# Patient Record
Sex: FEMALE | Race: WHITE | Employment: OTHER
[De-identification: names, ages, dates, MRNs, and addresses within clinical notes are randomized per-mention and may not be internally consistent; named-entity substitution may affect disease eponyms.]

---

## 2020-01-01 ENCOUNTER — HOME CARE VISIT (OUTPATIENT)
Dept: SCHEDULING | Facility: HOME HEALTH | Age: 85
End: 2020-01-01
Payer: MEDICARE

## 2020-01-01 ENCOUNTER — HOME CARE VISIT (OUTPATIENT)
Dept: HOSPICE | Facility: HOSPICE | Age: 85
End: 2020-01-01
Payer: MEDICARE

## 2020-01-01 ENCOUNTER — HOSPICE ADMISSION (OUTPATIENT)
Dept: HOSPICE | Facility: HOSPICE | Age: 85
End: 2020-01-01
Payer: MEDICARE

## 2020-01-01 VITALS
TEMPERATURE: 99.5 F | HEART RATE: 96 BPM | DIASTOLIC BLOOD PRESSURE: 46 MMHG | SYSTOLIC BLOOD PRESSURE: 104 MMHG | RESPIRATION RATE: 24 BRPM

## 2020-01-01 VITALS
HEART RATE: 100 BPM | SYSTOLIC BLOOD PRESSURE: 66 MMHG | TEMPERATURE: 99.7 F | RESPIRATION RATE: 28 BRPM | DIASTOLIC BLOOD PRESSURE: 42 MMHG

## 2020-01-01 VITALS
TEMPERATURE: 97.9 F | SYSTOLIC BLOOD PRESSURE: 72 MMHG | RESPIRATION RATE: 20 BRPM | DIASTOLIC BLOOD PRESSURE: 40 MMHG | HEART RATE: 100 BPM

## 2020-01-01 VITALS
BODY MASS INDEX: 17.67 KG/M2 | SYSTOLIC BLOOD PRESSURE: 104 MMHG | RESPIRATION RATE: 18 BRPM | DIASTOLIC BLOOD PRESSURE: 64 MMHG | OXYGEN SATURATION: 90 % | HEART RATE: 93 BPM | HEIGHT: 60 IN | TEMPERATURE: 97.4 F | WEIGHT: 90 LBS

## 2020-01-01 VITALS — HEART RATE: 96 BPM | SYSTOLIC BLOOD PRESSURE: 106 MMHG | DIASTOLIC BLOOD PRESSURE: 64 MMHG

## 2020-01-01 VITALS
RESPIRATION RATE: 20 BRPM | HEART RATE: 84 BPM | SYSTOLIC BLOOD PRESSURE: 98 MMHG | SYSTOLIC BLOOD PRESSURE: 100 MMHG | DIASTOLIC BLOOD PRESSURE: 56 MMHG | DIASTOLIC BLOOD PRESSURE: 68 MMHG | HEART RATE: 88 BPM | RESPIRATION RATE: 20 BRPM

## 2020-01-01 PROCEDURE — G0156 HHCP-SVS OF AIDE,EA 15 MIN: HCPCS

## 2020-01-01 PROCEDURE — 0651 HSPC ROUTINE HOME CARE

## 2020-01-01 PROCEDURE — G0299 HHS/HOSPICE OF RN EA 15 MIN: HCPCS

## 2020-01-01 PROCEDURE — A6250 SKIN SEAL PROTECT MOISTURIZR: HCPCS

## 2020-01-01 PROCEDURE — 3336500001 HSPC ELECTION

## 2020-01-01 PROCEDURE — HOSPICE MEDICATION HC HH HOSPICE MEDICATION

## 2020-01-01 PROCEDURE — G0155 HHCP-SVS OF CSW,EA 15 MIN: HCPCS

## 2020-01-01 PROCEDURE — T4541 LARGE DISPOSABLE UNDERPAD: HCPCS

## 2020-01-01 PROCEDURE — 3331090004 HSPC SERVICE INTENSITY ADD-ON

## 2020-01-01 PROCEDURE — T4526 ADULT SIZE PULL-ON MED: HCPCS

## 2020-08-17 PROBLEM — G30.9 ALZHEIMER'S DEMENTIA (HCC): Status: ACTIVE | Noted: 2020-01-01

## 2020-08-17 PROBLEM — Z51.5 HOSPICE CARE: Status: ACTIVE | Noted: 2020-01-01

## 2020-08-17 PROBLEM — F02.80 ALZHEIMER'S DEMENTIA (HCC): Status: ACTIVE | Noted: 2020-01-01

## 2020-08-25 PROBLEM — R44.1 EPISODES OF FORMED VISUAL HALLUCINATIONS: Status: ACTIVE | Noted: 2020-01-01

## 2020-08-25 PROBLEM — I69.391 DYSPHAGIA AS LATE EFFECT OF CEREBROVASCULAR ACCIDENT (CVA): Status: ACTIVE | Noted: 2020-01-01

## 2020-08-25 PROBLEM — I69.993 CVA, OLD, ATAXIA: Status: ACTIVE | Noted: 2020-01-01

## 2020-08-25 PROBLEM — I95.0 IDIOPATHIC HYPOTENSION: Status: ACTIVE | Noted: 2020-01-01

## 2020-08-25 PROBLEM — F01.50 VASCULAR DEMENTIA WITHOUT BEHAVIORAL DISTURBANCE (HCC): Status: ACTIVE | Noted: 2020-01-01

## 2020-08-31 NOTE — PROGRESS NOTES
Pt/family has continued to decline  services since admit.  received call later part of day from pt' Rn Cm 8/28/2020 informing the  that she and the pt's daughter, Desmond Pastrana, had talked and that Desmond Pastrana felt that her brother Dariana Anand, may need some assistance in coping. Rn Jeb stated that a Monday call would be ok.  contacted and spoke with Eli asking how he could help. 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 Shared her concerns for her brother, Dariana Anand, saying she felt he could use some help. Explained that they had lost their father in 2001, they lost their 13 yr old brother, and that he is . Continued to say that he is not making good decisions. Dariana Anand is currently living with the pt, his mother, having moved from [facility] following [facility], seeking a place in the [facility] but sayi ng with mom until he can find a place. Eli wanted  not to mention talking with her to do a generic call to the house to see how things were going.  made call, no answer, left generic message requesting return call.  then called Eli back to update and Eli stated that she has now had caffeine and she called to let him know that I would be calling.  updated her on his attempt, leaving a messa ge, and Eli would like for  to try again later.  agreed and called Rn CM and updated on conversations. Karyle Canner will attempt in near future.

## 2020-09-02 VITALS
RESPIRATION RATE: 24 BRPM | SYSTOLIC BLOOD PRESSURE: 96 MMHG | DIASTOLIC BLOOD PRESSURE: 56 MMHG | TEMPERATURE: 98.5 F | HEART RATE: 92 BPM

## 2020-11-22 NOTE — PROGRESS NOTES
received a return call from Jillian Maradiaga, pt's son.  identified himself and simply stated that he was calling to check on them to see how they were doing. Anna Smith shared that he has noticed some changes but overall his mother is about the same. Shared that they are Christians and that he knows where she is going when she dies. They she will see and be with his daddy again and God is going to take her when he wants too and  they isn't anything they can do to change that. Stated he believed in the [employer] has been doing this for 2000 years now so it must be ok. In response to him having any needs or concerns, Annadonya Smith shared that he was doing ok but has the 's number and will call if he needed anything.  responded that they are in his thoughts and prayers. Anna Smith thanked the  for calling.  contacted and updated rSi Russ on  conversation.
Pupils equal, round and reactive to light, Extra-ocular movement intact, eyes are clear b/l